# Patient Record
Sex: FEMALE | Race: WHITE
[De-identification: names, ages, dates, MRNs, and addresses within clinical notes are randomized per-mention and may not be internally consistent; named-entity substitution may affect disease eponyms.]

---

## 2018-02-01 ENCOUNTER — HOSPITAL ENCOUNTER (OUTPATIENT)
Dept: HOSPITAL 88 - MRI | Age: 58
End: 2018-02-01
Attending: PSYCHIATRY & NEUROLOGY
Payer: MEDICARE

## 2018-02-01 DIAGNOSIS — M48.02: Primary | ICD-10-CM

## 2018-02-01 PROCEDURE — 72141 MRI NECK SPINE W/O DYE: CPT

## 2018-02-01 PROCEDURE — 82948 REAGENT STRIP/BLOOD GLUCOSE: CPT

## 2018-02-01 PROCEDURE — 36415 COLL VENOUS BLD VENIPUNCTURE: CPT

## 2018-02-01 NOTE — DIAGNOSTIC IMAGING REPORT
EXAMINATION: MRI of the cervical spine  without contrast 



HISTORY: Cervical spinal canal stenoses, numbness in the upper

extremities/hands for over a year

COMPARISON: Cervical spine MRI and 3/21/2016

TECHNIQUE: Sagittal T1, T2, STIR; axial T2, gradient echo.





FINDINGS:



Curvature: Normal lordosis.

Vertebrae: No evidence of neoplasm, infection, or fracture.

Foramen magnum: No mass, Chiari malformation, or basilar invagination.  

Spinal Cord: Subtle increased T2 signal intensity within the cord extending

from superior endplate of C5 to mid C7 due to severe canal stenosis and chronic

cord compression, worsened since prior MRI and 3/21/2016

Soft Tissues: Unremarkable.



Degenerative changes:



     C1-C2: Mild degenerative changes without stenoses



     C2-C3: Bilateral facet arthrosis without significant stenoses. Fusion of

the posterior elements on the right side.



     C3-C4: Disc osteophyte, uncovertebral and facet arthrosis. Moderate spinal

canal and severe left foraminal stenosis. Mild right foraminal stenoses



     C4-C5: Disc was 25 compare formation, bilateral uncovertebral and facet

arthrosis. Mild spinal canal, mild right and severe left foraminal stenoses



     C5-C6: Disc osteophyte complex formation, bilateral uncovertebral and

facet arthrosis. Moderate spinal canal and moderate bilateral foraminal

stenoses



     C6-C7: Disc osteophyte complex formation and new approximately 5 mm AP

diameter central disc protrusion, which results in severe spinal canal stenoses

and or compression.



Additional bilateral uncovertebral and facet arthrosis results in severe

bilateral foraminal stenoses.



     C7-T1: Mild symmetric disc bulge and facet arthrosis without significant

stenoses.



IMPRESSION:



1.  Worsening severe spinal canal stenoses at C6-C7 due to degenerative changes

and new disc herniation with likely chronic cord compression and increased

signal within the cord, which likely explains the patient's symptoms. A

neurosurgery or orthopedic consult is advised.



2.  Persistent severe degenerative bilateral foraminal stenosis at C6-7.



3.  Mild to moderate degenerative spinal canal stenosis at C3-C4, C4-C5 and

C5-C6.



4.  Severe degenerative foraminal stenosis on the left at C3-C4 and C4-C5 and

moderate bilaterally at C5-C6.



Signed by: Dr. Mayra Valdes M.D. on 2/1/2018 12:40 PM

## 2019-02-05 ENCOUNTER — HOSPITAL ENCOUNTER (OUTPATIENT)
Dept: HOSPITAL 88 - MRI | Age: 59
End: 2019-02-05
Attending: PSYCHIATRY & NEUROLOGY
Payer: MEDICARE

## 2019-02-05 DIAGNOSIS — M48.02: Primary | ICD-10-CM

## 2019-02-05 PROCEDURE — 72141 MRI NECK SPINE W/O DYE: CPT

## 2019-02-05 NOTE — DIAGNOSTIC IMAGING REPORT
Exam: Cervical spine MRI without IV contrast

History: Chronic neck pain

Comparison studies: Cervical spine MRI 2/1/2018, 3/21/2016 and 10/20/2014.



Technique: 

Sagittal and axial T1 and T2, sagittal STIR and axial T2*GRE.

Intravenous contrast: None 



Findings:



Alignment: Normal lordosis. No scoliosis.

Cervicomedullary junction: No abnormalities.  Patent foramen magnum.

Soft tissues: No T2 hyperintense inflammatory changes.

Spinal cord: Cord is focally compressed at C6-C7 due to degenerative canal

stenosis as described below with persistent increased T2 signal changes in the

cord from C5 to approximately mid C7, unchanged from 2/1/2018.



Vertebrae:  

No fractures, infection or neoplasm.



Degenerative changes:



C2-C3:

Mildly degenerated disc. Bilateral facet arthrosis with right facet fusion. No

significant foraminal stenosis. Patent canal.



C3-C4:

Mildly degenerated disc. Disc osteophyte complex, thickened ligamentum flavum,

uncovertebral arthrosis and facet arthrosis with moderate canal stenosis and

severe left and mild right foraminal stenosis.



C4-C5:

Mildly degenerated disc. Disc osteophyte complex, thickened ligamentum flavum,

uncovertebral arthrosis and facet arthrosis with mild to moderate canal

stenosis and severe left and mild right foraminal stenosis.



C5-C6:

Moderately degenerated disc with mild reactive degenerative endplate edema.

Disc osteophyte complex, uncovertebral arthrosis and facet arthrosis with mild

to moderate canal canal stenosis and moderate left and mild right foraminal

stenosis.



C6-C7:

Moderately degenerated disc with mild reactive degenerative endplate edema.

There is ossification of the posterior longitudinal ligament superior to the

disc space which in combination with disc osteophyte complex, 5 mm central disc

extrusion and thickened ligamentum flavum result in severe canal stenosis and

cord compression. Uncovertebral facet arthrosis result in severe bilateral

foraminal stenosis.



C7-T1:

Mild bilateral facet arthrosis. Patent canal and foramina.



Incidental findings:

Chronic inflammatory changes at the right mastoid tip.



IMPRESSION:



No significant changes from the prior cervical spine MRI of 2/1/2018.



1.  Multilevel disc degeneration, worse/moderate at C5-C6 and at C6-C7.

2.  Persistent severe degenerative canal stenosis and cord compression at C6-C7

where there is a central disc extrusion and OPLL superior to the disc space

with associated chronic cord signal changes.

3.  Moderate degenerative canal stenosis at C3-C4 and mild to moderate canal

stenosis at C4-C5 and at C5-C6.

4.  Multilevel degenerative foraminal stenosis; severe left at C3-C4 and at

C4-C5, moderate left at C5-C6 and severe bilaterally at C6-C7.



Signed by: Dr. Joe Airzmendi M.D. on 2/5/2019 3:22 PM

## 2020-02-18 ENCOUNTER — HOSPITAL ENCOUNTER (OUTPATIENT)
Dept: HOSPITAL 88 - MAMMO | Age: 60
End: 2020-02-18
Payer: MEDICARE

## 2020-02-18 DIAGNOSIS — Z12.31: Primary | ICD-10-CM

## 2020-02-18 PROCEDURE — 77067 SCR MAMMO BI INCL CAD: CPT

## 2020-08-09 ENCOUNTER — HOSPITAL ENCOUNTER (EMERGENCY)
Dept: HOSPITAL 88 - ER | Age: 60
Discharge: HOME | End: 2020-08-09
Payer: COMMERCIAL

## 2020-08-09 VITALS — SYSTOLIC BLOOD PRESSURE: 153 MMHG | DIASTOLIC BLOOD PRESSURE: 68 MMHG

## 2020-08-09 VITALS — BODY MASS INDEX: 30.05 KG/M2 | WEIGHT: 187 LBS | HEIGHT: 66 IN

## 2020-08-09 DIAGNOSIS — M25.562: Primary | ICD-10-CM

## 2020-08-09 DIAGNOSIS — M25.551: ICD-10-CM

## 2020-08-09 DIAGNOSIS — S70.01XA: ICD-10-CM
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
## 2020-08-09 DIAGNOSIS — S80.02XA: ICD-10-CM
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
## 2020-08-09 PROCEDURE — 99283 EMERGENCY DEPT VISIT LOW MDM: CPT

## 2020-08-09 NOTE — DIAGNOSTIC IMAGING REPORT
Hip complete



Indication:  

^FALL



Technique: AP and frogleg views of right hip obtained.



Comparison: None



Findings:



Right hip remains properly located.   The cortex appears intact throughout. 

Trochanters appear intact.  Minimal spurring evident from the superior

acetabulum.   Adjacent pubic rami appear intact.  Lower lumbar spine

demonstrates degenerative changes.  Vascular calcifications in the pelvis and

proximal lower extremities. There are surgical clips throughout the pelvis and

along the lower lumbar spine.



IMPRESSION:



Right hip properly located.  No convincing evidence for fracture.  



Signed by: Dr. Pawan Fitzgerald MD on 8/9/2020 12:59 PM

## 2020-08-09 NOTE — DIAGNOSTIC IMAGING REPORT
Left complete knee.



CPT CODE: 88062.



INDICATION:  

^FALL



COMPARISON: None



FINDINGS:  No evidence of acute fracture or dislocation.  Mild to moderate

degenerative changes of the medial, lateral, patellofemoral compartments.  No

joint effusion. Diffuse atherosclerotic calcifications.



IMPRESSION:



No acute traumatic pathology. Mild to moderate degenerative changes of the

knee.



Signed by: Dr. Pawan Fitzgerald MD on 8/9/2020 12:57 PM

## 2020-08-09 NOTE — EMERGENCY DEPARTMENT NOTE
History of Present Illnes


History of Present Illness


Chief Complaint:  Extremity Trauma/Pain


History of Present Illness


This is a 60 year old  female 61 Y/O FEMALE PT PRESENTS TO ED WITH 

REPORT OF FALL AT [facility]; PT REPORTS PAIN TO LEFT KNEE AND RIGHT HIP; NO OBVIOUS

DEFORMITY NOTED.


Historian:  Patient, Paramedic/EMS


Arrival Mode:  [place]


 Required:  No


Onset (how long ago):  minute(s)


Location:  left knee, right hip


Quality:  pain


Radiation:  Reports non-radiation


Severity:  severe


Onset quality:  sudden


Timing of current episode:  constant


Progression:  unchanged


Chronicity:  new


Context:  Denies recent illness


Relieving factors:  none


Exacerbating factors:  none


Associated symptoms:  Reports denies other symptoms


Treatments prior to arrival:  none





Past Medical/Family History


Physician Review


I have reviewed the patient's past medical and family history.  Any updates have

been documented here.





Past Medical History


Recent Fever:  No


Clinical Suspicion of Infectio:  No


New/Unexplained Change in Ment:  No


Past Medical History:  Hypertension, Diabetes, Cancer, Hyperlipedemia


Other Medical History:  


Parkinson's, Spinal stenosis, Neuropathy, Ovarian and Urterine cancer, HLD


Other Surgery:  


Toe amputation, ear surgery





Social History


Smoking Cessation:  Never Smoker


Counseling Performed:  No


Alcohol Use:  None


Any Illegal Drug Use:  No


TB Exposure/Symptoms:  No


Physically hurt or threatened:  No





Family History


Family history of heart diseas:  Yes





Other


Last Tetanus:  UTD


Any Pre-Existing Lines (PICC,:  No





Review of Systems


Review of Systems


Constitutional:  Reports no symptoms


EENTM:  Reports no symptoms


Cardiovascular:  Reports no symptoms


Respiratory:  Reports no symptoms


Gastrointestinal:  Reports no symptoms


Genitourinary:  Reports no symptoms


Musculoskeletal:  Reports as per HPI


Integumentary:  Reports no symptoms


Neurological:  Reports no symptoms


Psychological:  Reports no symptoms


Endocrine:  Reports no symptoms


Hematological/Lymphatic:  Reports no symptoms





Physical Exam


Related Data


Allergies:  


Coded Allergies:  


     ondansetron (Verified  Allergy, Intermediate, HALLUCINATIONS, 7/19/15)


Triage Vital Signs





Vital Signs








  Date Time  Temp Pulse Resp B/P (MAP) Pulse Ox O2 Delivery O2 Flow Rate FiO2


 


8/9/20 11:59 98.1 64 15 171/75 97 Room Air  








Vital signs reviewed:  Yes





Physical Exam


CONSTITUTIONAL





Constitutional:  Present well-developed, Present well-nourished


HENT


HENT:  Present normocephalic, Present atraumatic, Present oropharynx 

clear/moist, Present nose normal


HENT L/R:  Present left ext ear normal, Present right ext ear normal


EYES





Eyes:  Reports PERRL, Reports conjunctivae normal


NECK


Neck:  Present ROM normal


PULMONARY


Pulmonary:  Present effort normal, Present breath sounds normal


CARDIOVASCULAR





Cardiovascular:  Present regular rhythm, Present heart sounds normal, Present 

capillary refill normal, Present normal rate


GASTROINTESTINAL





Abdominal:  Present soft, Present nontender, Present bowel sounds normal


GENITOURINARY





Genitourinary:  Present exam deferred


SKIN


Skin:  Present warm, Present dry


MUSCULOSKELETAL





Musculoskeletal:  Present tenderness (moderate tenderness anterior left knee, no

deformity, no ligament instability. Mild tenderness right hip, pelvis stable)


NEUROLOGICAL





Neurological:  Present alert, Present oriented x 3, Present no gross motor or 

sensory deficits


PSYCHOLOGICAL


Psychological:  Present mood/affect normal, Present judgement normal





Results


Imaging


Imaging results reviewed:  Yes





Assessment & Plan


Medical Decision Making


MDM


fall, c/o left knee and right hip pain - check xray r/o fracture





Reassessment


Reassessment


xrays normal, pt feels better. On Tx PMPAware, pt has recently had Norco 5, 

Diazepam, and Ambien filled. Recommend RICE, F/U PCP and Ortho





Assessment & Plan


Final Impression:  


(1) Fall


(2) Multiple contusions


Depart Disposition:  HOME, SELF-CARE


Last Vital Signs











  Date Time  Temp Pulse Resp B/P (MAP) Pulse Ox O2 Delivery O2 Flow Rate FiO2


 


8/9/20 12:07 99.0 84 18 153/68 97 Room Air  








Home Meds


Reported Medications


Sulfamethoxazole/Trimethoprim (BACTRIM DS TABLET) 1 Each Tablet, 1 TAB PO BID, 

#60 TAB


   7/16/15


Ciprofloxacin Hcl (CIPRO) 500 Mg Tablet, 500 MG PO Q12H, #30 TAB


   7/16/15


Insulin Glargine (LANTUS) 100 Units/Ml  Ml, 60 UNIT SC HS


   7/15/15


Insulin Lispro (HUMALOG) 100 Unit/1 Ml Cartridge


   7/15/15


Meloxicam (MELOXICAM) 15 Mg Tablet, 15 MG PO BEDTIME


   7/15/15


Losartan Potassium (LOSARTAN POTASSIUM) 25 Mg Tablet, 25 MG PO DAILY


   7/15/15


Amoxicillin/Potassium Clav (AMOX TR-K -125 MG TAB) 1 Each Tablet, PO BID


   7/15/15


Pramipexole Di-Hcl (MIRAPEX) 0.125 Mg Tablet, 0.125 MG PO HS


   7/15/15


Metformin Hcl (METFORMIN HCL) 500 Mg Tablet, 500 MG PO BID, #60 TAB


   7/15/15


Rifampin (RIFAMPIN) 300 Mg Capsule, 300 MG PO DAILY, CAP


   7/15/15


Simvastatin (SIMVASTATIN) 20 Mg Tablet, 20 MG PO HS, EA


   7/15/15


Gabapentin (GABAPENTIN) 400 Mg Capsule, 800 MG PO BID, #30 CAP


   7/15/15


Medications in the ED





Acetaminophen/ Hydrocodone Bitart 1 ea NOW  ONCE PO ;  Start 8/9/20 at 13:00;  

Stop 8/9/20 at 13:06;  Status DC











ZAHRAA KUMAR MD                Aug 9, 2020 13:15

## 2020-09-11 ENCOUNTER — HOSPITAL ENCOUNTER (OUTPATIENT)
Dept: HOSPITAL 88 - RAD | Age: 60
End: 2020-09-11
Attending: PSYCHIATRY & NEUROLOGY
Payer: MEDICARE

## 2020-09-11 DIAGNOSIS — S83.281A: ICD-10-CM

## 2020-09-11 DIAGNOSIS — S89.92XA: Primary | ICD-10-CM

## 2020-09-11 NOTE — DIAGNOSTIC IMAGING REPORT
Radiographs of the left and right knee - 2 views each knee



HISTORY:  Pain

COMPARISON: None available.

     

FINDINGS:

Bones:

No acute displaced fracture.  

Osseous alignment is within normal limits.



Joints:

Moderate tricompartmental degenerative arthrosis most pronounced in the left

medial compartment with joint space narrowing and peripheral osteophytosis. No

osseous erosion.



Soft tissues:

Scattered vascular calcification 





IMPRESSION: 

Moderate tricompartmental degenerative arthrosis most pronounced in the left

medial compartment with joint space narrowing and peripheral osteophytosis. No

osseous erosion.



Signed by: Dr. Sebastián Lemos M.D. on 9/11/2020 1:02 PM

## 2021-05-20 NOTE — XMS REPORT
Clinical Summary

                             Created on: 2020



Dee Judd

External Reference #: [id number]

: 1960

Sex: Female



Demographics





                          Address                   [address] [place]  [address]

 

                          Home Phone                [phone]

 

                          Preferred Language        Unknown

 

                          Marital Status            Single

 

                          Orthodoxy Affiliation     [facility]

 

                          Race                      Unknown

 

                          Ethnic Group              Unknown





Author





                          Author                    [facility]

ict

 

                          Organization              [facility]

ict

 

                          Address                   Unknown

 

                          Phone                     Unavailable







Support





                Name            Relationship    Address         Phone

 

                    Theresa Judd        ECON                [address] [place]  [address]                    [phone]

 

                Unk             ECON            Unknown         Unavailable







Care Team Providers





                    Care Team Member Name Role                Phone

 

                          PCP                       Unavailable







Allergies





                                        Comments



                 Active Allergy  Reactions       Severity        Noted Date 

 

                                        



dizziness



                     Ondansetron Hcl (Pf)  Nausea and          2009 



                                         Vomiting   







Medications





                          End Date                  Status



              Medication   Sig          Dispensed    Refills      Start  



                                         Date  

 

                                                    Active



              HYDROcodone-acetaminophen  Take 1 tablet  30 tablet    5          

  10/02/201  



                     (NORCO) 5-325 mg    by mouth            3  



                           tabletIndications:        every 4 hours     



                           Osteomyelitis             as needed for     



                                         Pain.     

 

                                                    Active



              blood glucose  Use as       1 Kit        0            10/22/201  



                     meterIndications: DM  directed.           3  



                                         (diabetes mellitus)      

 

                                                    Active



              CAPSAICIN 0.025 % topical  Apply up to 4  60 g         6          

    



                     creamIndications:   times day.          4  



                                         Diabetic foot ulcer,      



                                         Diabetic neuropathy      

 

                                                    Active



              pramipexole (MIRAPEX)  Take 1 tablet  90 tablet    4              



                     0.125 mg            by mouth at         4  



                           tabletIndications:        bedtime.     



                                         Diabetic neuropathy      

 

                                                    Active



              Cadexomer Iodine  Apply to     40 g         4            

  



                     (IODOSORB) 0.9 % topical  affected area       4  



                           gelIndications: Diabetic  daily as     



                           foot ulcer                needed for     



                                         Wound Care.     

 

                                                    Active



              gemfibrozil (LOPID) 600  Take 1 tablet  180 tablet   3            

  



                     mg tabletIndications: HLD  by mouth 2          4  



                           (hyperlipidemia)          times daily     



                                         (before     



                                         meals).     

 

                                                    Active



              ergocalciferol (VITAMIN  Take 1       12 capsule   0              



                     D2) 50,000 unit     capsule by          4  



                           capsuleIndications:       mouth weekly.     



                                         Vitamin D deficiency      

 

                                                    Active



              blood glucose test  Check BG 4   3 Box        11           /

01  



                     stripsIndications: DM  times weekly.       4  



                                         (diabetes mellitus)      

 

                                                    Active



              omeprazole (PRILOSEC) 20  Take 2       360 capsule  3            0

  



                     mg delayed release  capsules by         4  



                           capsuleIndications: GERD  mouth 2 times     



                           (gastroesophageal reflux  daily.     



                                         disease)      

 

                                                    Active



              gabapentin (NEURONTIN)  1 po tid.    270 capsule  3              



                           300 mg                    4  



                                         capsuleIndications:      



                                         Diabetic neuropathy      

 

                                                    Active



              lisinopril (PRINIVIL,  Take 1 tablet  90 tablet    3              



                     ZESTRIL) 20 mg      by mouth            4  



                           tabletIndications: HTN    daily.     



                                         (hypertension)      

 

                                                    Active



              simvastatin (ZOCOR) 20 mg  Take 1 tablet  90 tablet    3          

    



                     tabletIndications: HLD  by mouth at         4  



                           (hyperlipidemia)          bedtime.     

 

                                                    Active



              furosemide (LASIX) 20 mg  Take 1 tablet  90 tablet    3           

   



                     tabletIndications: Edema  by mouth            4  



                                         daily.     

 

                                                    Active



              LANCETSIndications:  Use as       1 Box        10             



                     Uncontrolled type II  Directed.           4  



                                         diabetes mellitus      

 

                                                    Active



              blood glucose test  Use as       1 Box        10           01/14/2

01  



                     (PRECISION XTRA TEST  directed..          4  



                                         STRIPS)      



                                         stripsIndications:      



                                         Uncontrolled type II      



                                         diabetes mellitus      

 

                                                    Active



              glyBURIDE-metFORMIN  Take 2       360 tablet   3              



                     (GLUCOVANCE) 5-500 mg per  tablets by          4  



                           tabletIndications: DM     mouth 2 times     



                           (diabetes mellitus)       daily (with     



                                         meals).     

 

                                                    Active



              nortriptyline (PAMELOR)  Take 1       30 capsule   5              



                     25 mg capsuleIndications:  capsule by          4  



                           Diabetic neuropathy       mouth at     



                                         bedtime     



                                         nightly.     

 

                                                    Active



              insulin needles,  Inject 4     10 Box       3            

  



                     disposable, (NOVOFINE) 30  Syringes            4  



                           x 1/3 "                   under the     



                           needlesIndications: DM    skin daily     



                           (diabetes mellitus), type  Inject     



                           2, uncontrolled           subcutaneousl     



                                         y. Twice     



                                         daily.     

 

                                                    Active



              acetaminophen-codeine  Take 1 tablet  30 tablet    0              



                     (TYLENOL #3) 300-30 mg  by mouth            4  



                           per tabletIndications:    every 4 hours     



                           Diabetic foot ulcer with  as needed for     



                           osteomyelitis             Pain.     

 

                                                    Active



              Cadexomer Iodine  Apply to     40 g         0            

  



                     (IODOSORB) 0.9 % topical  affected area       4  



                           gelIndications: Diabetic  daily as     



                           foot ulcer                needed for     



                                         Wound Care.     

 

                                                    Active



              Insulin REGULAR  Use 43 units  3 Month      0            

  



                 CONCENTRATED (HUMULIN R  in am and 40    Supply          4  



                           CONCENTRATED) 500 unit/mL  units at     



                           injection                 night.     

 

                                                    Active



              rifampin (RIFADIN) 300 mg  Take 2       60 capsule   2            

  



                     capsuleIndications:  capsules by         5  



                           Chronic osteomyelitis of  mouth daily.     



                                         foot      

 

                                                    Active



                     insulin lispro (HUMALOG)  Inject 20           0   



                           100 unit/mL injection     Units under     



                                         the skin 3     



                                         times daily.     

 

                                                    Active



                     insulin glargine (LANTUS)  Inject 60           0   



                           100 unit/mL injection     Units under     



                                         the skin at     



                                         bedtime     



                                         nightly.     

 

                                                    Active



                     metFORMIN (GLUCOPHAGE)  Take 1,000 mg       0   



                           500 mg tablet             by mouth 2     



                                         times daily     



                                         (with meals).     

 

                                                    Active



              Miscellaneous Medical  Patient      1 Each       0              



                     Supply MiscIndications:  needing             5  



                           Wound, open, foot with    motorized     



                           complication              scooter for     



                                         mobility as     



                                         she is to     



                                         take weight     



                                         off her right     



                                         foot with non     



                                         healing     



                                         ulcer.     

 

                                                    Active



              mupirocin (BACTROBAN) 2 %  Apply a small  30 g         5          

    



                     ointmentIndications:  amount to           5  



                           Diabetic foot ulcer       wound daily     



                                         and cover     



                                         with gauze.     







Active Problems





 



                           Problem                   Noted Date

 

 



                           Migraine with aura, without mention of intractable mi

graine without mention  

2015



                                         of status migrainosus 

 

 



                           Type II or unspecified type diabetes mellitus with op

hthalmic  2015



                                         manifestations, not stated as uncontrol

led(250.50) 

 

 



                           Status post amputation of toe of right foot  20

13

 

 



                           Diabetic retinopathy      2013

 

 



                           Traumatic amputation of toe(s) (complete) (partial), 

without mention of  

2013



                                         complication 

 

 



                           Occluded PICC line        2012

 

 



                           Diabetes mellitus with ulcer of toe  2012

 

 



                           Type II or unspecified type diabetes mellitus with ne

urological  2012



                                         manifestations, uncontrolled(250.62) 

 

 



                           Osteomyelitis of foot     2012

 

 



                           S/P PICC central line placement  2012

 

 



                           Wound, open, foot with complication  2012

 

 



                           Avascular necrosis        2012

 

 



                           Charcot foot due to diabetes mellitus  2011

 

 



                           Diabetic neuropathy       2011

 

 



                           Diabetic foot ulcer       2011

 

 



                           Uterine cancer            2009

 

 



                                         Overview:



                                         Diagnosed at outside institution..stage

 unclear

 

 



                           Personal history of ovarian cancer  2009

 

 



                                         Overview:



                                         Diagnosed outside institution...stage u

nclear

 

 



                           Tobacco use disorder      2009

 

 



                                         HTN (hypertension) 

 

 



                                         Obesity 

 

 



                                         Nausea & vomiting 

 

 



                                         Overview:



                                         CT=







Immunizations





  



                     Name                Administration Dates  Next Due

 

  



                           Influenza Vaccine         2012, 2011, 2009 

 

  



                           PPV 23 Pneumococcal       2008 



                                         Polysaccaride  

 

  



                           Td Tetanus, diphtheria    2006 



                                         Toxoids Vaccine  







Family History





   



                 Medical History  Relation        Name            Comments

 

   



                           Diabetes                  Brother  

 

   



                           Diabetes                  Father  

 

   



                           Heart                     Father  

 

   



                           Hypertension              Father  

 

   



                     Cancer              Maternal            cervical



                                         Grandmother  

 

   



                           Diabetes                  Mother  

 

   



                           Heart                     Mother  

 

   



                           Hypertension              Mother  

 

   



                           Stroke                    Mother  







   



                 Relation        Name            Status          Comments

 

   



                     Brother             Alive               4

 

   



                                         Brother   

 

   



                     Father                          Congestive heart fa

ilure, kidney failure



                                         (Age 77+) 

 

   



                                         Maternal Grandmother   

 

   



                           Mother                    Alive 

 

   



                     Sister              Alive               4







Social History





                                        Date



                 Tobacco Use     Types           Packs/Day       Years Used 

 

                                         



                 Current Every Day Smoker  Cigarettes      0.5             37 

 

    



                                         Smokeless Tobacco: Never   



                                         Used   







                                        Tobacco Cessation: Ready to Quit: No; Co

unseling Given: Yes

Comments: STARTED SMOKING AT 11YEARS OF AGE decreased 1/2ppd X , from 2-3ppd







                    Drinks/Week         oz/Week             Comments



                                         Alcohol Use   

 

                                                             



                                         No   







 



                           Sex Assigned at Birth     Date Recorded

 

 



                                         Not on file 







                                        Industry



                           Job Start Date            Occupation 

 

                                        Not on file



                           Not on file               Not on file 







                                        Travel End



                           Travel History            Travel Start 

 





                                         No recent travel history available.







Last Filed Vital Signs

Not on file



Plan of Treatment





   



                 Health Maintenance  Due Date        Last Done       Comments

 

   



                     DM Foot Exam (Yearly)  2014, 



                                         2012 

 

   



                     Breast Cancer Scrn  2014, 



                           (Yearly)                  2012, 



                                         2009 

 

   



                     DM HGBA1C (Yearly)  2015, 



                                         10/05/2013, 



                                         2013, 



                                         Additional 



                                         history 



                                         exists 

 

   



                     DM Retinal Exam (Yearly)  2016, 



                                         2014, 



                                         10/07/2013, 



                                         Additional 



                                         history 



                                         exists 

 

   



                     Colonoscopy 12yr    2020 







Results

Not on fileafter 2019



Insurance





                                        Type



            Payer      Benefit    Subscriber ID  Effective  Phone      Address 



                           Plan /                    Dates   



                                         Group     

 

                                         



            [facility]        xxxxxxxxx  3/1/2014-P  [phone]  P

[address] 



                 MEDICARE        MEDICARE        resent          [id number] 



                           COMPLETE                  [place] 



                                         [id number] 

 

                                         



            [facility]        xxxxxxxxx  2014-P  [phone]  P

[address] 



                 COMMUNITY     COMMUNITY       resent          [id number] 



                           [place] 



                                         [address] 

 

                                         



            [facility] PLAN  FINANCIAL  xxxxxxxxx  2015-P  [phone]  [address] [place] [address] 







     



            Guarantor Name  Account    Relation to  Date of    Phone      Farrah douglas Address



                     Type                Patient             Birth  

 

     



            JuddDee roman  Personal/F  Self       1960  [phone]  [phone]               ([place])              [address]



                                         [place] [address]







Advance Directives





                          Date Inactivated          Comments



                           Code Status               Date Activated  

 

                          2013  1:26 PM         



                           Full Code                 2013 12:21 PM  







                                                     

 

                          2012  7:41 PM        



                           Full Code                 2012 10:14 AM
Continuity of Care Document

                             Created on: 2020



ZOE WILLIS

External Reference #: [id number]

: 1960

Sex: Female



Demographics





                          Address                   [address] 

[place]  [address]

 

                          Home Phone                [phone]

 

                          Preferred Language        English

 

                          Marital Status            Unknown

 

                          Yazidi Affiliation     Unknown

 

                          Race                      Unknown

 

                                        Additional Race(s)  

 

                          Ethnic Group              Unknown





Author





                          Author                    [facility]

t

 

                          Organization              [facility]

 

                          Address                   [address]

[place]  [address]



 

                          Phone                     Unavailable







Support





                Name            Relationship    Address         Phone

 

                    Theresa Willis      ECON                [address] 

[place]  [address]                    [phone]

 

                    ECON                Unknown             Unavailable







Care Team Providers





                    Care Team Member Name Role                Phone

 

                    TERE YANG     Attphys             Unavailable

 

                    Maranda Hardy    Attphys             [phone]

 

                    Dyllan MedLizette Martínez Attphys             ERNESTO Bunch    Attphys             [phone]

 

                    Camilo  Deborath Attphys             Unavailable

 

                    JARETH BAE     Attphys             Unavailable

 

                    Liza Mijares  Attphys             Unavailable

 

                    ERNESTO Muñiz    Unavailable         [phone]

 

                    Alice Page Unavailable         Unavailable







Problems





           Condition Name Condition Details Condition Category Status     Onset 

Date Resolution

Date            Last Treatment Date Treating Clinician Comments        Source

 

        Smoker          Condition Active  2018 00:00:00         2018

 11:43:47 Paulino Muñiz                                            [facility]

 

        BMI 37.0-37.9         Condition Active  2018 00:00:00         2018 13:58:15 

Alice Page                                  [facility]

 

        Poor dentition         Condition Active  2018 00:00:00         [facility]

26 15:31:57 

Paulino Muñiz                                    [facility]

 

          Diabetic peripheral neuropathy           Condition Active    2018 00:00:00           

2018 15:31:57 Paulino Muñiz                        [facility]

 

          Diabetes mellitus, type II           Condition Active    2018 00

:00:00           2018 

15:31:57            Paulino Muñiz                        [facility]

 

                                        Migraine with aura, without mention of i

ntractable migraine without mention of 

status migrainosus                      Migraine with aura, without mention of i

ntractable migraine 

without mention of status migrainosus Disease   Active    2015 00:00:00   

                             

                                        [facility]

 

                                        Type II or unspecified type diabetes pascale

litus with ophthalmic manifestations, 

not stated as uncontrolled(250.50)      Type II or unspecified type diabetes pascale

litus

with ophthalmic manifestations, not stated as uncontrolled(250.50) Disease      

             

Active     2015 00:00:00                                             [facility]

 

                          Status post amputation of toe of right foot Status pos

t amputation of toe of 

right foot Disease Active  2013 00:00:00                                 H

[facility]

 

          Diabetic retinopathy Diabetic retinopathy Disease   Active     00:00:00            

                                                            [facility]

 

                                        Traumatic amputation of toe(s) (complete

) (partial), without mention of 

complication                            Traumatic amputation of toe(s) (complete

) (partial), without 

mention of complication Disease Active  2013 00:00:00                     

            [facility]

 

        Occluded PICC line Occluded PICC line Disease Active  2012 00:00:0

0                          

                                        [facility]

 

                Diabetes mellitus with ulcer of toe Diabetes mellitus with ulcer

 of toe Disease         

Active     2012 00:00:00                                             [facility]

 

                                        Type II or unspecified type diabetes pascale

litus with neurological manifestations, 

uncontrolled(250.62)                    Type II or unspecified type diabetes pascale

litus with 

neurological manifestations, uncontrolled(250.62) Disease             Active    

          2012 

00:00:00                                                         [facility]

 

           Osteomyelitis of foot Osteomyelitis of foot Disease    Active     14 00:00:00  

                                                                [facility]

 

             S/P PICC central line placement S/P PICC central line placement Dis

ease      Active       

2012 00:00:00                                                     [facility]

ealth

 

                Wound, open, foot with complication Wound, open, foot with compl

ication Disease         

Active     2012 00:00:00                                             [facility]

 

        Avascular necrosis Avascular necrosis Disease Active  2012 00:00:0

0                          

                                        [facility]

 

                    Charcot foot due to diabetes mellitus Charcot foot due to di

abetes mellitus 

Disease   Active    2011 00:00:00                                         

[facility]

 

        Diabetic neuropathy Diabetic neuropathy Disease Active  2011 00:00

:00                  

                                                    [facility]

 

        Diabetic foot ulcer Diabetic foot ulcer Disease Active  2011 00:00

:00                  

                                                    [facility]

 

        Uterine cancer Uterine cancer Disease Active  2009 00:00:00       

                  Overview:

Diagnosed at outside institution..stage unclear [facility]

 

                Personal history of ovarian cancer Personal history of ovarian c

ancer Disease         

Active       2009 00:00:00                                        Overview

: Diagnosed outside institution...stage

unclear                                 [facility]

 

          Tobacco use disorder Tobacco use disorder Disease   Active     00:00:00            

                                                            [facility]

 

       HTN (hypertension) HTN (hypertension) Disease Active                     

               [facility]

 

       Obesity Obesity Disease Active                                    [facility]

 

       Nausea & vomiting Nausea & vomiting Disease Active                       

      Overview: CT= [facility]







Allergies, Adverse Reactions, Alerts





        Allergy Name Allergy Type Status  Severity Reaction(s) Onset Date Inacti

ve Date 

Treating Clinician        Comments                  Source

 

        ZOFRAN  Drug allergy (disorder) Active  High Criticality         2018 00:00:00          

                                                    [facility]

 

             Ondansetron Hcl (Pf) Propensity to adverse reactions to drug Active

                    Nausea and

Vomiting     2009 00:00:00                           dizziness    [facility]







Family History





           Family Member Diagnosis  Comments   Start Date Stop Date  Source

 

           Natural brother Diabetes                                    [facility]

 

           Natural father Diabetes                                    [facility]

[facility]

 

           [facility] Heart                                       [facility]

[facility]

 

           Natural father Hypertension                                  [facility]

[facility]

 

           Maternal grandmother Cancer                                      [facility]

 

           Natural mother Diabetes                                    [facility]

[facility]

 

           Natural mother Heart                                       [facility]

[facility]

 

           Natural mother Hypertension                                  [facility]

[facility]

 

           Natural mother Stroke                                      [facility]

[facility]







Social History





           Social Habit Start Date Stop Date  Quantity   Comments   Source

 

           History of tobacco use                       Cigarette Smoker        

    [facility]

 

           Sex Assigned At Birth                                             [facility]

 

           drug use, illicit 2019 11:49:40 2019 11:49:40 Never      

           [facility]

 

           alcohol use 2019 11:49:40 2019 11:49:40 Never            

     [facility]

 

                    is there any chance that you could be pregnant? 2019 1

1:49:40 2019 

11:49:40            No                                      [facility]

 

           passive cigarette smoke exposure 2019 11:49:40 2019 11:49

:40 No                    

[facility]

 

                          assessment of health literacy (NCQA [facility] 2014 Standard

s, 3C10) 2019 

11:49:40        2019 11:49:40 Adequate                        [facility]

 

                social history reviewed E&M 2019 11:49:40 2019 11:49

:40 reviewed 

today                                               [facility]

 

           Occupation #1 2018 14:10:16 2018 14:10:16 Disabled       

       [facility]

 

           patient considered to be homeless 2018 14:10:16 2018 14:1

0:16 No                    

[facility]

 

           smoking, advice to quit 2018 14:10:16 2018 14:10:16 Yes  

                 [facility]

 

                    Cigarettes smoked current (pack per day) - Reported  00:00:00 

2015 00:00:00                                         [facility]

 

           Cigarette pack-years 2015 00:00:00 2015 00:00:00         

              [facility]

 

                Alcohol intake  2015 00:00:00 2015 00:00:00 Current 

non-drinker of 

alcohol (finding)                                   [facility]

 

                Tobacco Comment 2009 00:00:00 2009 00:00:00 STARTED 

SMOKING AT 

11YEARS OF AGE decreased 1/2ppd X , from 2-3ppd                           [facility]







                Smoking Status  Start Date      Stop Date       Source

 

                Current every day smoker 2015 00:00:00                 [facility]







Medications





             Ordered Medication Name Filled Medication Name Start Date   Stop Da

te    Current 

Medication? Ordering Clinician Indication Dosage     Frequency  Signature (SIG) 

Comments                  Components                Source

 

        insulin lispro (HUMALOG) 100 unit/mL injection         2015 10:23:

38         Yes                     

20U                       Inject 20 Units under the skin 3 times daily.         

                  [facility]

 

        insulin glargine (LANTUS) 100 unit/mL injection         2015 10:23

:38         Yes                     

60U                       Inject 60 Units under the skin at bedtime nightly.    

                       [facility]

 

       metFORMIN (GLUCOPHAGE) 500 mg tablet        2015 10:23:38        Ye

s                  1000mg        

Take 1,000 mg by mouth 2 times daily (with meals).                              

           [facility]

 

          Miscellaneous Medical Supply Misc           2015 00:00:00       

    Yes                 Wound, open, 

foot with complication                                         Patient needing m

otorized scooter for mobility as she

is to take weight off her right foot with non healing ulcer.                    

                     [facility]

 

          mupirocin (BACTROBAN) 2 % ointment           2015 00:00:00      

     Yes                 Diabetic foot 

ulcer                            Apply a small amount to wound daily and cover w

ith gauze.                       [facility]

 

          rifampin (RIFADIN) 300 mg capsule           2015 00:00:00       

    Yes                 Chronic 

osteomyelitis of foot 600mg      QD         Take 2 capsules by mouth daily.     

                  [facility]

 

                    Insulin REGULAR CONCENTRATED (HUMULIN R CONCENTRATED) 500 un

it/mL injection                     

2014 00:00:00        Yes                                Use 43 units in am

 and 40 units at night.               

[facility]

 

          Cadexomer Iodine (IODOSORB) 0.9 % topical gel           2014 00:

00:00           Yes                 

Diabetic foot ulcer                                 Apply to affected area daily

 as needed for Wound Care.  

                                                    [facility]

 

                    insulin needles, disposable, (NOVOFINE) 30 x 1/3 " needles  

                   2014 00:00:00

                    Yes                 DM (diabetes mellitus), type 2, uncontro

lled 4{syringe} QD        Inject 4 

Syringes under the skin daily Inject  subcutaneously. Twice daily.              

                           [facility]

 

             acetaminophen-codeine (TYLENOL #3) 300-30 mg per tablet            

  2014 00:00:00              

Yes                       Diabetic foot ulcer with osteomyelitis 1{tbl}         

           Take 1 tablet by mouth 

every 4 hours as needed for Pain.                                         [facility]

 

          nortriptyline (PAMELOR) 25 mg capsule           2014 00:00:00   

        Yes                 Diabetic 

neuropathy 25mg                  Take 1 capsule by mouth at bedtime nightly.    

                   [facility]

 

             glyBURIDE-metFORMIN (GLUCOVANCE) 5-500 mg per tablet              2

 00:00:00              Yes

                          DM (diabetes mellitus) 2{tbl}                    Take 

2 tablets by mouth 2 times daily (with 

meals).                                                     [facility]

 

          CAPSAICIN 0.025 % topical cream           2014 00:00:00         

  Yes                 Diabetic 

neuropathy                       Apply up to 4 times day.                       

[facility]

 

          pramipexole (MIRAPEX) 0.125 mg tablet           2014 00:00:00   

        Yes                 Diabetic 

neuropathy .125mg                Take 1 tablet by mouth at bedtime.             

          [facility]

 

          Cadexomer Iodine (IODOSORB) 0.9 % topical gel           2014 00:

00:00           Yes                 

Diabetic foot ulcer                                 Apply to affected area daily

 as needed for Wound Care.  

                                                    [facility]

 

          gemfibrozil (LOPID) 600 mg tablet           2014 00:00:00       

    Yes                 HLD 

(hyperlipidemia)    600mg               Q.5D                Take 1 tablet by efe

th 2 times daily (before meals).

                                                            [facility]

 

          ergocalciferol (VITAMIN D2) 50,000 unit capsule           2014 0

0:00:00           Yes                 

Vitamin D deficiency 06045W                Take 1 capsule by mouth weekly.      

                 [facility]

 

        blood glucose test strips         2014 00:00:00         Yes       

      DM (diabetes mellitus)  

                          Check BG 4 times weekly.                           [facility]

 

           omeprazole (PRILOSEC) 20 mg delayed release capsule             00:00:00            Yes        

                GERD (gastroesophageal reflux disease) 40mg            Q.5D     

       Take 2 capsules by mouth 2 

times daily.                                                [facility]

 

          gabapentin (NEURONTIN) 300 mg capsule           2014 00:00:00   

        Yes                 Diabetic 

neuropathy                       1 po tid.                        [facility]

 

          lisinopril (PRINIVIL, ZESTRIL) 20 mg tablet           2014 00:00

:00           Yes                 HTN 

(hypertension) 20mg       QD         Take 1 tablet by mouth daily.              

         [facility]

 

          simvastatin (ZOCOR) 20 mg tablet           2014 00:00:00        

   Yes                 HLD 

(hyperlipidemia) 20mg                  Take 1 tablet by mouth at bedtime.       

                [facility]

 

       furosemide (LASIX) 20 mg tablet        2014 00:00:00        Yes    

       Edema  20mg   QD     Take

1 tablet by mouth daily.                                         [facility]

 

        LANCETS         2014 00:00:00         Yes             Uncontrolled

 type II diabetes mellitus          

                Use as Directed.                                 [facility]

 

             blood glucose test (PRECISION XTRA TEST STRIPS) strips             

 2014 00:00:00              

Yes             Uncontrolled type II diabetes mellitus                 Use as di

rected..                 [facility]

 

       blood glucose meter        2013-10-22 00:00:00        Yes           DM (d

iabetes mellitus)               Use

as directed.                                                [facility]

 

          HYDROcodone-acetaminophen (NORCO) 5-325 mg tablet           2013-10-02

 00:00:00           Yes                 

Osteomyelitis   1{tbl}                          Take 1 tablet by mouth every 4 h

ours as needed for Pain. 

                                                    [facility]







Immunizations





           Ordered Immunization Name Filled Immunization Name Date       Status 

    Comments   Source

 

           Influenza Vaccine            2012 00:00:00 Completed           

  [facility]

 

           Influenza Vaccine            2011 00:00:00 Completed           

  [facility]

 

           Influenza Vaccine            2009 00:00:00 Completed           

  [facility]

 

           PPV 23 Pneumococcal Polysaccaride            2008 00:00:00 Comp

leted             [facility]

 

           Td Tetanus, diphtheria Toxoids Vaccine            2006 00:00:00

 Completed             [facility]







Procedures

This patient has no known procedures.



Plan of Care





             Planned Activity Planned Date Details      Comments     Source

 

                    Future Scheduled Test 2020 00:00:00 Screening for maryanne

gnant neoplasm of 

colon (procedure) [code = 950152421]                           [facility] Scheduled Test 2016 00:00:00 DM Retinal Exam (Y

early) [code = DM 

Retinal Exam (Yearly)]                              [facility] Scheduled Test 2015 00:00:00 Hemoglobin A1c clyde

surement (procedure)

[code = 37709236]                                   [facility] Scheduled Test 2014 00:00:00 Breast Cancer Scrn

 (Yearly) [code = 

Breast Cancer Scrn (Yearly)]                           [facility] Scheduled Test 2014 00:00:00 DM Foot Exam (Year

ly) [code = DM Foot 

Exam (Yearly)]                                      [facility]







Encounters





             Start Date/Time End Date/Time Encounter Type Admission Type Attendi

[facility]   Care Department Encounter ID    Source

 

             2019 00:00:00 2019 00:00:00 Office Visit              Maranda Griggs [facility]

vices 

Encounter/[id number]              [facility]

 

          2019 00:00:00 2019 00:00:00 Office Visit           Paulino Muñiz [facility] Encounter/[id number] [facility]

 

          2019 00:00:00 2019 00:00:00 Office Visit           Paulino Muñiz [facility] Encounter/[id number] [facility]

 

          2019 00:00:00 2019 00:00:00 Office Visit           Paulino Muñiz [facility] Encounter/[id number] [facility]

 

          2019 00:00:00 2019 00:00:00 Office Visit           Paulino Muñiz [facility] Encounter/[id number] [facility]

 

          2019 00:00:00 2019 00:00:00 Office Visit           Paulino Muñiz [facility] Encounter/[id number] [facility]

 

             2019 00:00:00 2019 00:00:00 Office Visit              Paulino Erazo Deborath  [facility] 

Encounter/[id number]              [facility]

 

          2018 00:00:00 2018 00:00:00 Office Visit           Paulino Muñiz       

[id number] [facility]

 

          2018 00:00:00 2018 00:00:00 Office Visit           Paulino Muñiz       

[facility] En[facility]/[id number] [facility]

 

             2018 00:00:00 2018 00:00:00 Office Visit              Paulino Erazo [facility]                 [facility] 

Encounter/[id number]              [facility]

 

          2018 00:00:00 2018 00:00:00 Office Visit           Paulino Muñiz       

[facility] Encounter/[id number] [facility]

 

          2018 00:00:00 2018 00:00:00 Office Visit           Paulino Muñiz       

[facility] Encounter/[id number] [facility]

 

          2018 00:00:00 2018 00:00:00 Office Visit           Paulino Muñiz       

[facility] Encounter/[id number] [facility]

 

          2018 00:00:00 2018 00:00:00 Office Visit           Paulino Muñiz       

[facility] Encounter/[id number] [facility]

 

             2018 00:00:00 2018 00:00:00 Office Visit              Paulino Erazo [facility]                 [facility] 

Encounter/[id number]              [facility]

 

          2018 00:00:00 2018 00:00:00 Office Visit           Paulino Muñiz       

[facility][id number] [facility]

 

          2018 00:00:00 2018 00:00:00 Office Visit           Paulino Muñiz       

[facility] Encounter/[id number] [facility]

 

          2018 00:00:00 2018 00:00:00 Office Visit           Liza Puri [facility] Encounter/[id number] [facility]

 

             2018 00:00:00 2018 00:00:00 Office Visit              Paulino Erazo Deborath  [facility] 

Encounter/[id number]              [facility]

 

          2018 00:00:00 2018 00:00:00 Office Visit           Paulino Muñiz [facility] Encounter/[id number] [facility]







Results





           Test Description Test Time  Test Comments Results    Result Comments 

Source

 

                MAMMOGRAPHY DIGITAL SCR BILAT 2020 11:58:00               

                              

                                                         [place] [address]      Patient Name: ZOE WILLIS                          
        MR #: [id number]                     : 1960                    
              Age/Sex: 59/F  Acct #: [id number]                              
Req #: [id number]  Adm Physician:                                               
      Ordered by: TERE YANG MD                            Report #: [id number]
[id number]        Location: MAMMO                                   Room/Bed:         
           
________________________________________________________________________________

___________________    Procedure: [id number] MG/MAMMOGRAPHY DIGITAL SCR BILAT  
Exam Date: 20                            Exam Time: 1050                  
                           REPORT STATUS: Signed       #[id number] - [id number]
  #BILATERAL DIGITAL SCREENING MAMMOGRAM WITH CAD: 2020   CLINICAL: Routine
screening.        No prior exams were available for comparison.     The tissue 
of both breasts is heterogeneously dense. This may lower the sensitivity of 
mammography.       Current study was also evaluated with a Computer Aided 
Detection (CAD) system.     There are benign calcifications in both breasts.  
There also is a benign lymph node in the right    breast.     No significant 
masses, calcifications, or other findings are seen in either breast.        
IMPRESSION: BENIGN   There is no mammographic evidence of malignancy.  A 1 year 
screening mammogram is recommended.    The patient will be notified by letter of
the results.           BRADEN huggins/eran:3/3/2020 09:54:12  
     Imaging Technologist: Kera VERDUGO(R)(M), [facility]   letter sent: Normal Exam     Mammogram BI-RADS: 2 Benign     Dictated 
By: BRADEN VIVAR MD  Electronically Signed By: BRADEN VIVAR MD on 20  
Transcribed By: ERAN on 20       COPY TO:   TERE YANG MD       
                                                     

 

                    immature granulocytes, percentage of total cells, blood 2019 14:36:00   

 

                                        Test Item

 

                          immature granulocytes, percentage of total cells, bloo

d (test code = 665124) 0 %

                                                             





[facility], udlqdntv2505-77-24 14:36:00* 



             Test Item    Value        Reference Range Interpretation Comments

 

             basophil count, absolute (test code = 15468) 0.0 x10E3/uL 0.0-0.2  

                  





[facility] HealthEosinophil Absolute Kupvz6276-47-65 14:36:00* 



             Test Item    Value        Reference Range Interpretation Comments

 

             Eosinophil Absolute Count (test code = 844259) 0.5 X10E3/UL 0.0-0.4

      H             





[facility]ocyte count, blood, fbqohytic8536-35-38 14:36:00* 



             Test Item    Value        Reference Range Interpretation Comments

 

             monocyte count, blood, automated (test code = 3076) 0.4 X10E3/UL 0.

1-0.9                    





[facility] count, blood, mryvfiahq3835-77-06 14:36:00* 



             Test Item    Value        Reference Range Interpretation Comments

 

             lymphocyte count, blood, automated (test code = 3074) 2.6 X10E3/UL 

0.7-3.1                    





[facility] HealthAbsolute Tqsbitixugq7796-86-03 14:36:00* 



             Test Item    Value        Reference Range Interpretation Comments

 

             Absolute Neutrophils (test code = 61558) 4.4 X10E3/UL 1.4-7.0      

              





[facility]basophils as percent of blood nakmkeicrt1147-65-19 
14:36:00* 



             Test Item    Value        Reference Range Interpretation Comments

 

             basophils as percent of blood leukocytes (test code = 2426) 0 %    

                                 





[facility]theosinophils as percent of blood anlfljwaty2885-20-89 
14:36:00* 



             Test Item    Value        Reference Range Interpretation Comments

 

             eosinophils as percent of blood leukocytes (test code = 4170) 6 %  

                                   





[facility] Healthmonocytes as percent of blood fyakzdrksj7352-75-31 
14:36:00* 



             Test Item    Value        Reference Range Interpretation Comments

 

             monocytes as percent of blood leukocytes (test code = 2421) 5 %    

                                 





[facility]lymphocytes as percent of blood uuhdjeojtw9739-79-06 
14:36:00* 



             Test Item    Value        Reference Range Interpretation Comments

 

             lymphocytes as percent of blood leukocytes (test code = 317) 33 %  

                                  





[facility]neutrophils as percent of blood plbppiwfgn2542-73-69 
14:36:00* 



             Test Item    Value        Reference Range Interpretation Comments

 

             neutrophils as percent of blood leukocytes (test code = 316) 56 %  

                                  





[facility]platelet qogpx9809-22-20 14:36:00* 



             Test Item    Value        Reference Range Interpretation Comments

 

             platelet count (test code = 66) 221 X10E3/-379               

     





[facility]red blood cell distribution aakyq7774-55-00 14:36:00* 



             Test Item    Value        Reference Range Interpretation Comments

 

             red blood cell distribution width (test code = 1030) 14.7 %       1

2.3-15.4                  





[facility] corpuscular hemoglobin concentration, [id number] 
14:36:00* 



             Test Item    Value        Reference Range Interpretation Comments

 

                    mean corpuscular hemoglobin concentration, RBC (test code = 

1029) 31.9 G/DL           

31.5-35.7                                            





[facility] corpuscular hemoglobin, [id number] 14:36:00* 



             Test Item    Value        Reference Range Interpretation Comments

 

             mean corpuscular hemoglobin, RBC (test code = 1031) 27.3 pg      26

.6-33.0                  





[facility] corpuscular volume, [id number] 14:36:00* 



             Test Item    Value        Reference Range Interpretation Comments

 

             mean corpuscular volume, RBC (test code = 315) 86 fL        79-97  

                    





[facility]hematocrit, zfuao0452-27-94 14:36:00* 



             Test Item    Value        Reference Range Interpretation Comments

 

             hematocrit, blood (test code = 64) 34.8 %       34.0-46.6          

        





[facility]hemoglobin, yscyi6581-61-10 14:36:00* 



             Test Item    Value        Reference Range Interpretation Comments

 

             hemoglobin, blood (test code = 65) 11.1 g/dL    11.1-15.9          

        





[facility]altherythrocyte (RBC) gxmiv7767-42-62 14:36:00* 



             Test Item    Value        Reference Range Interpretation Comments

 

             erythrocyte (RBC) count (test code = 67) 4.07 X10E6/UL 3.77-5.28   

               





[facility]leukocyte count, vnnrs2333-44-39 14:36:00* 



             Test Item    Value        Reference Range Interpretation Comments

 

             leukocyte count, blood (test code = 68) 7.9 X10E3/UL 3.4-10.8      

             





[facility] SPINE CERVICAL [id number] 15:02:00                 
                                                                    [place] [address]      Patient Name: ZOE WILLIS            
                      MR #: [id number]                     : 1960      
                            Age/Sex: 58/F  Acct #: [id number]                 
            Req #: [id number]  Adm Physician:                                   
                  Ordered by: JARETH BAE MD                            
Report #: [id number]        Location: MRI                                     
Room/Bed:                     _________________________________________________
__________________________________________________    Procedure: [id number] MRI/M
[facility] SPINE CERVICAL WO  Exam Date:                             Exam Time:         
                                     REPORT STATUS: Signed    Exam: Cervical sp
ine MRI without IV contrast   History: Chronic neck pain   Comparison studies: C
ervical spine MRI 2018, 3/21/2016 and 10/20/2014.      Technique:    Sagitta
l and axial T1 and T2, sagittal STIR and axial T2*GRE.   Intravenous contrast: N
one       Findings:      Alignment: Normal lordosis. No scoliosis.   Cervicomedu
llary junction: No abnormalities.  Patent foramen magnum.   Soft tissues: No T2 
hyperintense inflammatory changes.   Spinal cord: Cord is focally compressed at 
C6-C7 due to degenerative canal   stenosis as described below with persistent in
creased T2 signal changes in the   cord from C5 to approximately mid C7, unchang
ed from 2018.      Vertebrae:     No fractures, infection or neoplasm.      
Degenerative changes:      C2-C3:   Mildly degenerated disc. Bilateral facet art
hrosis with right facet fusion. No   significant foraminal stenosis. Patent polina
l.      C3-C4:   Mildly degenerated disc. Disc osteophyte complex, thickened lig
amentum flavum,   uncovertebral arthrosis and facet arthrosis with moderate polina
l stenosis and   severe left and mild right foraminal stenosis.      C4-C5:   Mi
ldly degenerated disc. Disc osteophyte complex, thickened ligamentum flavum,   u
ncovertebral arthrosis and facet arthrosis with mild to moderate canal   stenosi
s and severe left and mild right foraminal stenosis.      C5-C6:   Moderately de
generated disc with mild reactive degenerative endplate edema.   Disc osteophyte
complex, uncovertebral arthrosis and facet arthrosis with mild   to moderate ca
nal canal stenosis and moderate left and mild right foraminal   stenosis.      C
6-C7:   Moderately degenerated disc with mild reactive degenerative endplate clementine
ma.   There is ossification of the posterior longitudinal ligament superior to t
he   disc space which in combination with disc osteophyte complex, 5 mm central 
disc   extrusion and thickened ligamentum flavum result in severe canal stenosis
and   cord compression. Uncovertebral facet arthrosis result in severe bilateral
  foraminal stenosis.      C7-T1:   Mild bilateral facet arthrosis. Patent canal
and foramina.      Incidental findings:   Chronic inflammatory changes at the 
right mastoid tip.      IMPRESSION:      No significant changes from the prior 
cervical spine MRI of 2018.      1.  Multilevel disc degeneration, worse/mod
erate at C5-C6 and at C6-C7.   2.  Persistent severe degenerative canal stenosis
and cord compression at C6-C7   where there is a central disc extrusion and OPLL
superior to the disc space   with associated chronic cord signal changes.   3.  
Moderate degenerative canal stenosis at C3-C4 and mild to moderate canal   sonia
nosis at C4-C5 and at C5-C6.   4.  Multilevel degenerative foraminal stenosis; s
evere left at C3-C4 and at   C4-C5, moderate left at C5-C6 and severe bilaterall
y at C6-C7.      Signed by: Dr. Tolu Rivera M.D. on 2019 3:22 PM        D
ictated By: TOLU RIVERA MD  Electronically Signed By: TOLU RIVERA MD on 
19 152  Transcribed By: BERNARDA on 19 1522       COPY TO:   JARETH BAE MD        prothrombin time (patient)2018 14:12:00* 



             Test Item    Value        Reference Range Interpretation Comments

 

             prothrombin time (patient) (test code = 50) 10.0 s       9.1-12.0  

                 





[facility]international normalized ratio (INR)2018 14:12:00* 



             Test Item    Value        Reference Range Interpretation Comments

 

             international normalized ratio (INR) (test code = 309) 1.0         

 0.8-1.2                    





[facility]alanine aminotransferase (SGPT), ieqmh0410-41-87 14:12:00
  * 



             Test Item    Value        Reference Range Interpretation Comments

 

             alanine aminotransferase (SGPT), serum (test code = 40) 11 1/L     

  0-32                       





[facility] Healthaspartate aminotransferase (SGOT), ewbbm2501-69-42 
14:12:00* 



             Test Item    Value        Reference Range Interpretation Comments

 

             aspartate aminotransferase (SGOT), serum (test code = 39) 17 1/L   

    0-40                       





[facility]alkaline phosphatase, kdbqu5729-24-61 14:12:00* 



             Test Item    Value        Reference Range Interpretation Comments

 

             alkaline phosphatase, serum (test code = 3) 95 1/L           

                 





[facility]bilirubin, serum, qnwyz9642-41-62 14:12:00* 



             Test Item    Value        Reference Range Interpretation Comments

 

             bilirubin, serum, total (test code = 43) 0.3 mg/dL    0.0-1.2      

              





[facility]albumin/globulin ratio, nkxur2026-67-02 14:12:00* 



             Test Item    Value        Reference Range Interpretation Comments

 

             albumin/globulin ratio, serum (test code = 146) 1.3          1.2-2.

2                    





[facility]globulin, eyunq3836-07-36 14:12:00* 



             Test Item    Value        Reference Range Interpretation Comments

 

             globulin, serum (test code = 3059) 3.2          1.5-4.5            

        





[facility] Healthalbumin, hheem0387-74-22 14:12:00* 



             Test Item    Value        Reference Range Interpretation Comments

 

             albumin, serum (test code = 2) 4.0 g/dL     3.5-5.5                

    





[facility]protein, total, tqbbm8488-94-45 14:12:00* 



             Test Item    Value        Reference Range Interpretation Comments

 

             protein, total, serum (test code = 36) 7.2 g/dL     6.0-8.5        

            





[facility]calcium, ziefe2446-03-40 14:12:00* 



             Test Item    Value        Reference Range Interpretation Comments

 

             calcium, serum (test code = 11) 9.3 mg/dL    8.7-10.2              

     





[facility] Healthcarbon dioxide, venous sweyh8157-12-09 14:12:00* 



             Test Item    Value        Reference Range Interpretation Comments

 

             carbon dioxide, venous blood (test code = 15) 26 mmol/L    18-29   

                   





[facility]chloride, zrtlc7827-94-49 14:12:00* 



             Test Item    Value        Reference Range Interpretation Comments

 

             chloride, serum (test code = 13) 100 mmol/L                  

      





[facility]potassium, tdcla7659-96-16 14:12:00* 



             Test Item    Value        Reference Range Interpretation Comments

 

             potassium, serum (test code = 35) 4.6 mmol/L   3.5-5.2             

       





[facility]sodium, pyhyj2331-77-50 14:12:00* 



             Test Item    Value        Reference Range Interpretation Comments

 

             sodium, serum (test code = 159) 142 mmol/L   134-144               

     





[facility] Healthurea nitrogen/creatinine ratio, ccxja3317-11-67 14:12:00
  * 



             Test Item    Value        Reference Range Interpretation Comments

 

             urea nitrogen/creatinine ratio, serum (test code = 2462) 21        

   9-23                       





[facility] HealtheGFR if African Vqjzxklx6031-32-64 14:12:00* 



             Test Item    Value        Reference Range Interpretation Comments

 

             eGFR if  (test code = 440836) 99 mL/min/((173/100).

m2) >59                        





[facility] HealthEstimated Glomerular Filtration Rate (calc)2018 
14:12:00* 



             Test Item    Value        Reference Range Interpretation Comments

 

                          Estimated Glomerular Filtration Rate (calc) (test code

 = 47098) 86 

mL/min/((173/100).m2) >59                                      





[facility]creatinine, eirgu9229-42-64 14:12:00* 



             Test Item    Value        Reference Range Interpretation Comments

 

             creatinine, serum (test code = 18) 0.78 mg/dL   0.57-1.00          

        





[facility] Healthurea nitrogen, bfpbc7309-06-78 14:12:00* 



             Test Item    Value        Reference Range Interpretation Comments

 

             urea nitrogen, blood (test code = 9) 16 mg/dL     6-24             

          





[facility]blood glucose, edppnn0114-30-74 14:12:00* 



             Test Item    Value        Reference Range Interpretation Comments

 

             blood glucose, random (test code = 8) 144 mg/dL    65-99        H  

           





[facility]immature granulocytes, percentage of total cells, blood
2018 14:12:00* 



             Test Item    Value        Reference Range Interpretation Comments

 

                          immature granulocytes, percentage of total cells, bloo

d (test code = 910180) 0 %

                                                             





[facility]basophil count, xeffmmbc5915-95-92 14:12:00* 



             Test Item    Value        Reference Range Interpretation Comments

 

             basophil count, absolute (test code = 07981) 0.0 x10E3/uL 0.0-0.2  

                  





[facility] HealthEosinophil Absolute Seqml7382-94-72 14:12:00* 



             Test Item    Value        Reference Range Interpretation Comments

 

             Eosinophil Absolute Count (test code = 710462) 0.4 X10E3/UL 0.0-0.4

                    





[facility]monocyte count, blood, blfravvhu8164-78-05 14:12:00* 



             Test Item    Value        Reference Range Interpretation Comments

 

             monocyte count, blood, automated (test code = 3076) 0.4 X10E3/UL 0.

1-0.9                    





[facility]lymphocyte count, blood, rtnmooyml7651-99-15 14:12:00* 



             Test Item    Value        Reference Range Interpretation Comments

 

             lymphocyte count, blood, automated (test code = 3074) 2.2 X10E3/UL 

0.7-3.1                    





[facility] HealthAbsolute Kwynjiontvx2857-10-14 14:12:00* 



             Test Item    Value        Reference Range Interpretation Comments

 

             Absolute Neutrophils (test code = 37629) 3.4 X10E3/UL 1.4-7.0      

              





[facility]basophils as percent of blood jxkegzscrs3125-87-96 
14:12:00* 



             Test Item    Value        Reference Range Interpretation Comments

 

             basophils as percent of blood leukocytes (test code = 2426) 0 %    

                                 





[facility] Healtheosinophils as percent of blood ojadmhbdly6260-40-15 
14:12:00* 



             Test Item    Value        Reference Range Interpretation Comments

 

             eosinophils as percent of blood leukocytes (test code = 4170) 7 %  

                                   





[facility] Healthmonocytes as percent of blood asaffbsarc3481-92-87 
14:12:00* 



             Test Item    Value        Reference Range Interpretation Comments

 

             monocytes as percent of blood leukocytes (test code = 2421) 6 %    

                                 





[facility] Healthlymphocytes as percent of blood frwgawktrq5582-45-21 
14:12:00* 



             Test Item    Value        Reference Range Interpretation Comments

 

             lymphocytes as percent of blood leukocytes (test code = 317) 34 %  

                                  





[facility] Healthneutrophils as percent of blood bizbebcpov6075-82-83 
14:12:00* 



             Test Item    Value        Reference Range Interpretation Comments

 

             neutrophils as percent of blood leukocytes (test code = 316) 53 %  

                                  





[facility] Healthplatelet qfaxv5053-17-50 14:12:00* 



             Test Item    Value        Reference Range Interpretation Comments

 

             platelet count (test code = 66) 225 X10E3/-379               

     





[facility]red blood cell distribution zczmn5589-03-56 14:12:00* 



             Test Item    Value        Reference Range Interpretation Comments

 

             red blood cell distribution width (test code = 1030) 15.4 %       1

2.3-15.4                  





[facility] corpuscular hemoglobin concentration, [id number] 
14:12:00* 



             Test Item    Value        Reference Range Interpretation Comments

 

                    mean corpuscular hemoglobin concentration, RBC (test code = 

1029) 32.0 G/DL           

31.5-35.7                                            





[facility] corpuscular hemoglobin, [id number] 14:12:00* 



             Test Item    Value        Reference Range Interpretation Comments

 

             mean corpuscular hemoglobin, RBC (test code = 1031) 26.8 pg      26

.6-33.0                  





[facility] corpuscular volume, [id number] 14:12:00* 



             Test Item    Value        Reference Range Interpretation Comments

 

             mean corpuscular volume, RBC (test code = 315) 84 fL        79-97  

                    





[facility]hematocrit, mcfzm5149-86-85 14:12:00* 



             Test Item    Value        Reference Range Interpretation Comments

 

             hematocrit, blood (test code = 64) 35.6 %       34.0-46.6          

        





[facility]hemoglobin, mqupx1384-39-10 14:12:00* 



             Test Item    Value        Reference Range Interpretation Comments

 

             hemoglobin, blood (test code = 65) 11.4 g/dL    11.1-15.9          

        





[facility] Healtherythrocyte (RBC) nioht0838-27-90 14:12:00* 



             Test Item    Value        Reference Range Interpretation Comments

 

             erythrocyte (RBC) count (test code = 67) 4.26 X10E6/UL 3.77-5.28   

               





[facility]leukocyte count, tmhbd8391-68-78 14:12:00* 



             Test Item    Value        Reference Range Interpretation Comments

 

             leukocyte count, blood (test code = 68) 6.4 X10E3/UL 3.4-10.8      

             





[facility]    [place] [address]      Patient 
Name: ZOE WILLIS   MR #: [id number]    : 1960 Age/Sex: 57/F  Acct #:
[id number] Req #: [id number]  Adm Physician:     Ordered by: JARETH BAE MD
 Report #: [id number]   Location: MRI  Room/Bed:     
___________________________________________________________________________
________________________    Procedure: [id number] MRI/MRI SPINE CERVICAL WO  Exam
Date:                             Exam Time:        REPORT STATUS: Signed    EX
AMINATION: MRI of the cervical spine  without contrast       HISTORY: Cervical s
leyda canal stenoses, numbness in the upper   extremities/hands for over a year 
 COMPARISON: Cervical spine MRI and 3/21/2016   TECHNIQUE: Sagittal T1, T2, STI
R; axial T2, gradient echo.         FINDINGS:      Curvature: Normal lordosis.  
Vertebrae: No evidence of neoplasm, infection, or fracture.   Foramen magnum: No
mass, Chiari malformation, or basilar invagination.     Spinal Cord: Subtle in
creased T2 signal intensity within the cord extending   from superior endplate o
f C5 to mid C7 due to severe canal stenosis and chronic   cord compression, wors
ened since prior MRI and 3/21/2016   Soft Tissues: Unremarkable.      Degenerati
ve changes:           C1-C2: Mild degenerative changes without stenoses         
 C2-C3: Bilateral facet arthrosis without significant stenoses. Fusion of   the 
posterior elements on the right side.           C3-C4: Disc osteophyte, uncover
tebral and facet arthrosis. Moderate spinal   canal and severe left foraminal st
enosis. Mild right foraminal stenoses           C4-C5: Disc was 25 compare forma
tion, bilateral uncovertebral and facet   arthrosis. Mild spinal canal, mild rig
ht and severe left foraminal stenoses           C5-C6: Disc osteophyte complex f
ormation, bilateral uncovertebral and   facet arthrosis. Moderate spinal canal a
nd moderate bilateral foraminal   stenoses           C6-C7: Disc osteophyte comp
kalee formation and new approximately 5 mm AP   diameter central disc protrusion, 
which results in severe spinal canal stenoses   and or compression.      Additio
nal bilateral uncovertebral and facet arthrosis results in severe   bilateral fo
raminal stenoses.           C7-T1: Mild symmetric disc bulge and facet arthrosis
without significant   stenoses.      IMPRESSION:      1.  Worsening severe spin
al canal stenoses at C6-C7 due to degenerative changes   and new disc herniation
with likely chronic cord compression and increased   signal within the cord, wh
ich likely explains the patient's symptoms. A   neurosurgery or orthopedic consu
lt is advised.      2.  Persistent severe degenerative bilateral foraminal steno
sis at C6-7.      3.  Mild to moderate degenerative spinal canal stenosis at C3-
C4, C4-C5 and   C5-C6.      4.  Severe degenerative foraminal stenosis on the le
ft at C3-C4 and C4-C5 and   moderate bilaterally at C5-C6.      Signed by: Dr. XIOMARA Valdes M.D. on 2018 12:40 PM        Dictated By: CESAR VALDES MD  Electr
onically Signed By: CESAR VALDES MD on 18 1240  Transcribed By: BERNARDA on 
18 1240       COPY TO:   JARETH BAE MD
[facility] Services Summary

                             Created on: 2019



Dee Jdud

External Reference #: [id number]

: 1960

Sex: Female



Demographics





                          Address                   [address]  [address]

 

                          Home Phone                [phone]

 

                          Preferred Language        Unknown

 

                          Marital Status            Unmarried

 

                          [employer] Affiliation     Unknown

 

                          Race                      White

 

                          Ethnic Group              Non-





Author





                          Author                    Admin, [employer]

 

                          Organization              Unknown

 

                          Address                   Unknown

 

                          Phone                     Unavailable







PROBLEMS





             Condition    Status       Date         Provider     Notes

 

             Smoker       active          Paulino Muñiz   

 

             BMI 37.0-37.9 active          Alice Page   

 

             Poor dentition active          Paulino Muñiz   

 

             Diabetic peripheral neuropathy active          Paulino chaudhary   

 

             Diabetes mellitus, type II active          Paulino alaniz   







ENCOUNTERS





             Date         Type         Provider     Location     Encounter Diagn

osis

 

                     -   Ambulatory Encounter  Maranda Mijares

[facility]

 

                     -   [facility]ulatory [facility]  [facility]iz 

[facility]

 

                     -   [facility]  [facility] 

[facility]

 

                 -   Ambulatory Encounter  Paulino Muñiz  

[facility]

 

                     -   [facility]ulatory [facility]  [facility] 

[facility]

 

                     -   Ambulatory Encounter  [facility] Muñiz 

[facility]

 

                 -   Ambulatory Encounter  Paulino Muñiz  

[facility]

 

                     -   [facility]

 

                     -   Ambulatory Encounter  [facility] Muñiz 

[facility]

 

                 -   Ambulatory Encounter  Paulino Muñiz  

[facility]

 

                     -   Ambulatory Encounter  Paulino Jenkins Page Griceldajack Paeg  [facility] 

Smoker

 

                     -   Ambulatory En[facility]  [facility]iz 

[facility]

 

                 -   Ambulatory Encounter  Paulino Muñiz  

[facility]

 

                     -   Ambulatory Encounter  Paulino Hernandez                 [facility]

 

                 -   [facility]  

[facility]

 

                     -   [facility]ulatory Encounter  Paulino Jenkins Page [facility] BMI

37.0-37.9

 

                     -   [facility]

 

                 -   Ambulatory Encounter  Paulino Muñiz  

[facility]

 

                 -   Ambulatory Encounter  Liza Dyllan 

 [facility]             K

 

                     -   Ambulatory Encounter  Paulino Jenkins Page [facility] 

Diabetes mellitus, type IIDiabetic peripheral neuropathyPoor dentition







VITAL SIGNS





No Information Available



ALLERGIES





             Allergy Name Onset Date   Reaction     Criticality  Status

 

             ZOFRAN                       High Criticality active







REASON FOR REFERRAL





No Information Available



RESULTS





          Date      Observation Value     Provider  Reference Range Interpretati

on Location

 

                      immature granulocytes, percentage of total cells

, blood 0 %             [facility]

                    Not Estab.                               

 

          "         basophil count, absolute 0.0 x10E3/uL [facility]  0.0-0.2    

          

 

          "         Eosinophil Absolute Count 0.5 X10E3/UL [facility]  0.0-0.4   

High       

 

          "         monocyte count, blood, automated 0.4 X10E3/UL [facility]  0.1

-0.9              

 

          "         lymphocyte count, blood, automated 2.6 X10E3/UL [facility]  0

.7-3.1              

 

          "         Absolute Neutrophils 4.4 X10E3/UL [facility]  1.4-7.0        

      

 

          "         basophils as percent of blood leukocytes 0 %       [facility]

  Not Estab.            

 

          "         eosinophils as percent of blood leukocytes 6 %       LinkLog

ic  Not Estab.            

 

          "         monocytes as percent of blood leukocytes 5 %       [facility]

  Not Estab.            

 

          "         lymphocytes as percent of blood leukocytes 33 %      LinkLog

ic  Not Estab.            

 

          "         neutrophils as percent of blood leukocytes 56 %      LinkLog

ic  Not Estab.            

 

          "         platelet count 221 X10E3/UL [facility]  150-379              

 

          "         red blood cell distribution width 14.7 %    [facility]  12.3-

15.4            

 

           "          mean corpuscular hemoglobin concentration, RBC 31.9 G/DL  

[facility]  31.5-35.7  

                                         

 

          "         mean corpuscular hemoglobin, RBC 27.3 pg   [facility]  26.6-3

3.0            

 

          "         mean corpuscular volume, RBC 86 fL     [facility]  79-97     

           

 

          "         hematocrit, blood 34.8 %    [facility]  34.0-46.6            

 

          "         hemoglobin, blood 11.1 g/dL [facility]  11.1-15.9            

 

          "         erythrocyte (RBC) count 4.07 X10E6/UL [facility]  3.77-5.28  

          

 

          "         leukocyte count, blood 7.9 X10E3/UL [facility]  3.4-10.8     

        

 

           prothrombin time (patient) 10.0 s    [facility]  9.1-12.0   

          

 

          "         international normalized ratio (INR) 1.0       [facility]  0.

8-1.2              

 

          "         alanine aminotransferase (SGPT), serum 11 1/L    [facility]  

0-32                 

 

          "         aspartate aminotransferase (SGOT), serum 17 1/L    [facility]

  0-40                 

 

          "         alkaline phosphatase, serum 95 1/L    [facility]       

          

 

          "         bilirubin, serum, total 0.3 mg/dL [facility]  0.0-1.2        

      

 

          "         albumin/globulin ratio, serum 1.3       [facility]  1.2-2.2  

            

 

          "         globulin, serum 3.2       [facility]  1.5-4.5              

 

          "         albumin, serum 4.0 g/dL  [facility]  3.5-5.5              

 

          "         protein, total, serum 7.2 g/dL  [facility]  6.0-8.5          

    

 

          "         calcium, serum 9.3 mg/dL [facility]  8.7-10.2             

 

          "         carbon dioxide, venous blood 26 mmol/L [facility]  18-29     

           

 

          "         chloride, serum 100 mmol/L [facility]                 

 

          "         potassium, serum 4.6 mmol/L [facility]  3.5-5.2              

 

          "         sodium, serum 142 mmol/L [facility]  134-144              

 

          "         urea nitrogen/creatinine ratio, serum 21        [facility]  9

-23                 

 

          "         eGFR if  99 mL/min/((173/100).m2) [facility] 

 >59                  

 

                "               Estimated Glomerular Filtration Rate (calc) 86 m

L/min/((173/100).m2) [facility]

                    >59                                      

 

          "         creatinine, serum 0.78 mg/dL [facility]  0.57-1.00           

 

 

          "         urea nitrogen, blood 16 mg/dL  [facility]  6-24              

   

 

          "         blood glucose, random 144 mg/dL [facility]  65-99     High   

    

 

             "            immature granulocytes, percentage of total cells, bloo

d 0 %          [facility]    Not 

Estab.                                               

 

          "         basophil count, absolute 0.0 x10E3/uL [facility]  0.0-0.2    

          

 

          "         Eosinophil Absolute Count 0.4 X10E3/UL [facility]  0.0-0.4   

           

 

          "         monocyte count, blood, automated 0.4 X10E3/UL [facility]  0.1

-0.9              

 

          "         lymphocyte count, blood, automated 2.2 X10E3/UL [facility]  0

.7-3.1              

 

          "         Absolute Neutrophils 3.4 X10E3/UL [facility]  1.4-7.0        

      

 

          "         basophils as percent of blood leukocytes 0 %       [facility]

  Not Estab.            

 

          "         eosinophils as percent of blood leukocytes 7 %       LinkLog

ic  Not Estab.            

 

          "         monocytes as percent of blood leukocytes 6 %       [facility]

  Not Estab.            

 

          "         lymphocytes as percent of blood leukocytes 34 %      LinkLog

ic  Not Estab.            

 

          "         neutrophils as percent of blood leukocytes 53 %      LinkLog

ic  Not Estab.            

 

          "         platelet count 225 X10E3/UL [facility]  150-379              

 

          "         red blood cell distribution width 15.4 %    [facility]  12.3-

15.4            

 

           "          mean corpuscular hemoglobin concentration, RBC 32.0 G/DL  

[facility]  31.5-35.7  

                                         

 

          "         mean corpuscular hemoglobin, RBC 26.8 pg   [facility]  26.6-3

3.0            

 

          "         mean corpuscular volume, RBC 84 fL     [facility]  79-97     

           

 

          "         hematocrit, blood 35.6 %    [facility]  34.0-46.6            

 

          "         hemoglobin, blood 11.4 g/dL [facility]  11.1-15.9            

 

          "         erythrocyte (RBC) count 4.26 X10E6/UL [facility]  3.77-5.28  

          

 

          "         leukocyte count, blood 6.4 X10E3/UL [facility]  3.4-10.8     

        







HISTORY OF IMMUNIZATIONS





No Information Available



Medications

No Known Medication Information



SOCIAL HISTORY





                Date            Observation     Value           Provider

 

                      Exercise Program Referral T               Gricelda

 Page 

 

                "               Weight Management Counseling Provided T         

       

 

                "               Nutrition intervention T                

 

                "               passive cigarette smoke exposure No             

 Debora Page 

 

                "               drug use, illicit Never           Deborath Jericho

dos 

 

                "               smoking status  current every day smoker Deborat

h Page 

 

                "               alcohol use     Never           Deborath Amy

s 

 

                "               social history reviewed E&M reviewed today  Gayatri

rath Page 

 

                "               is there any chance that you could be pregnant? 

No              Debora Page 

 

                "               assessment of health literacy ([facility]  

[facility] 3[facility]) Adequate        

Debora Page 

 

                      Exercise Program Referral T               Gricelda

 Page 

 

                "               Weight Management Counseling Provided T         

      Deb Page 

 

                "               Nutrition intervention T                

 

                "               drug use, illicit Never           Deborath Jericho

dos 

 

                "               alcohol use     Never           Deborath Amy

s 

 

                "               social history reviewed E&M reviewed today  Gayatri

rath Page 

 

                "               is there any chance that you could be pregnant? 

No              Debora Page 

 

                "               assessment of health literacy ([facility]  

[facility], 3C10) Adequate        

Debora Page 

 

                "               passive cigarette smoke exposure No             

 Deb Page 

 

                "               smoking status  current every day smoker Deborat

h Page 

 

                      drug use, illicit Never           Deborath Jericho

dos 

 

                "               alcohol use     Never           Deborath Amy

s 

 

                "               social history reviewed E&M reviewed today  Gayatri

rath Page 

 

                "               is there any chance that you could be pregnant? 

No              Debora Page 

 

                "               passive cigarette smoke exposure No             

 Deborath Page 

 

                "               smoking status  current every day smoker Deborat

h Page 

 

                "               assessment of health literacy ([facility]  [facility], 3C10) Adequate        

Deborath Page 

 

                      Exercise Program Referral T               Gricelda

 Page 

 

                "               Weight Management Counseling Provided T         

      Corinneora Page 

 

                "               Nutrition intervention T               [place] 

 

                "               drug use, illicit Never           Corinneora Jericho

dos 

 

                "               alcohol use     Never           ora Amy

s 

 

                "               Occupation #1   Disabled        [place]

s 

 

                "               patient considered to be homeless No            

  [facility] Page 

 

                "               is there any chance that you could be pregnant? 

No              [place] 

 

                "               passive cigarette smoke exposure No             

 [facility] 

 

                "               smoking, advice to quit Yes             Corinneorajack

 Page 

 

                "               smoking status  current every day smoker Griceldat

h Page 

 

                "               assessment of health literacy ([employer]QA PCM 2014 [address]

[place], 3C10) Adequate        

[facility] 







FUNCTIONAL STATUS





No Information Available



MENTAL STATUS





                Date            Observation     Value           Provider

 

                      assessment of judgment and insight E&M intact   

       Paulino Muñiz 

 

                    "                   mental status examination: orientation E

&M oriented to time, place, and person

                                        Paulino Muñiz 

 

                "               assessment of mood and affect E&M no depression,

 anxiety, or agitation Paulino Muñiz 

 

                "               Generalized Anxiety Disorder Questionnaire - Que

stion 2 0               [facility] 

 

                "               Generalized Anxiety Disorder Questionnaire - Que

stion 1 0               [facility] 

 

                      assessment of judgment and insight E&M intact   

       Paulino Muñiz 

 

                    "                   mental status examination: orientation E

&M oriented to time, place, and person

                                        Paulino Muñiz 

 

                "               assessment of mood and affect E&M no depression,

 anxiety, or agitation Paulino Muñiz 

 

                "               Generalized Anxiety Disorder Questionnaire - Que

stion 2 0               [facility] 

 

                "               Generalized Anxiety Disorder Questionnaire - Que

stion 1 0               [facility] 

 

                      assessment of judgment and insight E&M intact   

       Paulino Muñiz 

 

                    "                   mental status examination: orientation E

&M oriented to time, place, and person

                                        Paulino ERNESTO Muñiz 

 

                "               assessment of mood and affect E&M no depression,

 anxiety, or agitation Paulino Muñiz 

 

                "               Generalized Anxiety Disorder Questionnaire - Que

stion 2 0               [facility] 

 

                "               Generalized Anxiety Disorder Questionnaire - Que

stion 1 0               [facility] 

 

                      assessment of judgment and insight E&M intact   

       Paulino Muñiz 

 

                    "                   mental status examination: orientation E

&M oriented to time, place, and person

                                        Paulino ERNESTO Muñiz 

 

                "               assessment of mood and affect E&M no depression,

 anxiety, or agitation Paulino Muñiz 

 

                "               Generalized Anxiety Disorder Questionnaire - Que

stion 2 0               [facility] 

 

                "               Generalized Anxiety Disorder Questionnaire - Que

stion 1 0               Alice Birdados 







MEDICAL EQUIPMENT





No Information Available



FAMILY HISTORY





No Information Available



INSURANCE PROVIDERS





No Information Available



ADVANCE DIRECTIVES





No Information Available



TREATMENT PLAN





                          Date                      Name

 

                                          CBC With Differential/Platel

et

 

                                          Comp. Metabolic Panel (14)

 

                                          CBC With Differential/Platel

et

 

                                          Prothrombin Time (PT) with I

NR

 

                                          Est Patient Exp Problem - 99

213

 

                                          Est Patient Exp Problem - 99

213

 

                                          Est Patient Exp Problem - 99

213

 

                                          Est Patient Exp Problem - 99

213







HISTORY OF PROCEDURES





No Information Available                           



GOALS

               



No Information Available                                    



HEALTH CONCERNS

               



No Information Available
Yes